# Patient Record
Sex: MALE | Race: AMERICAN INDIAN OR ALASKA NATIVE
[De-identification: names, ages, dates, MRNs, and addresses within clinical notes are randomized per-mention and may not be internally consistent; named-entity substitution may affect disease eponyms.]

---

## 2017-02-23 ENCOUNTER — HOSPITAL ENCOUNTER (EMERGENCY)
Dept: HOSPITAL 5 - ED | Age: 27
LOS: 1 days | Discharge: HOME | End: 2017-02-24
Payer: COMMERCIAL

## 2017-02-23 DIAGNOSIS — S32.018A: Primary | ICD-10-CM
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
## 2017-02-23 DIAGNOSIS — R56.9: ICD-10-CM

## 2017-02-23 DIAGNOSIS — E87.6: ICD-10-CM
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
## 2017-02-23 DIAGNOSIS — S46.912A: ICD-10-CM

## 2017-02-23 DIAGNOSIS — F17.200: ICD-10-CM
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
## 2017-02-23 DIAGNOSIS — F19.10: ICD-10-CM

## 2017-02-23 DIAGNOSIS — S46.911A: ICD-10-CM
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
## 2017-02-23 LAB
ALBUMIN SERPL-MCNC: 4.3 G/DL (ref 3.9–5)
ALBUMIN/GLOB SERPL: 1.4 %
ALP SERPL-CCNC: 57 UNITS/L (ref 35–129)
ALT SERPL-CCNC: 66 UNITS/L (ref 7–56)
ANION GAP SERPL CALC-SCNC: 19 MMOL/L
APTT BLD: 28.1 SEC. (ref 24.2–36.6)
BASOPHILS NFR BLD AUTO: 0.1 % (ref 0–1.8)
BILIRUB SERPL-MCNC: 0.4 MG/DL (ref 0.1–1.2)
BUN SERPL-MCNC: 8 MG/DL (ref 9–20)
BUN/CREAT SERPL: 5.33 %
CALCIUM SERPL-MCNC: 9.3 MG/DL (ref 8.4–10.2)
CHLORIDE SERPL-SCNC: 97.9 MMOL/L (ref 98–107)
CO2 SERPL-SCNC: 25 MMOL/L (ref 22–30)
EOSINOPHIL NFR BLD AUTO: 0.1 % (ref 0–4.3)
GLUCOSE SERPL-MCNC: 139 MG/DL (ref 75–100)
HCT VFR BLD CALC: 40.7 % (ref 35.5–45.6)
HGB BLD-MCNC: 13.1 GM/DL (ref 11.8–15.2)
INR PPP: 1.19 (ref 0.87–1.13)
MAGNESIUM SERPL-MCNC: 2 MG/DL (ref 1.7–2.3)
MCH RBC QN AUTO: 27 PG (ref 28–32)
MCHC RBC AUTO-ENTMCNC: 32 % (ref 32–34)
MCV RBC AUTO: 84 FL (ref 84–94)
PLATELET # BLD: 252 K/MM3 (ref 140–440)
POTASSIUM SERPL-SCNC: 3.3 MMOL/L (ref 3.6–5)
PROT SERPL-MCNC: 7.3 G/DL (ref 6.3–8.2)
RBC # BLD AUTO: 4.86 M/MM3 (ref 3.65–5.03)
SODIUM SERPL-SCNC: 139 MMOL/L (ref 137–145)
URINE DRUGS OF ABUSE NOTE: (no result)
WBC # BLD AUTO: 16.8 K/MM3 (ref 4.5–11)

## 2017-02-23 PROCEDURE — 85025 COMPLETE CBC W/AUTO DIFF WBC: CPT

## 2017-02-23 PROCEDURE — 83735 ASSAY OF MAGNESIUM: CPT

## 2017-02-23 PROCEDURE — 93005 ELECTROCARDIOGRAM TRACING: CPT

## 2017-02-23 PROCEDURE — 72128 CT CHEST SPINE W/O DYE: CPT

## 2017-02-23 PROCEDURE — 72131 CT LUMBAR SPINE W/O DYE: CPT

## 2017-02-23 PROCEDURE — 93010 ELECTROCARDIOGRAM REPORT: CPT

## 2017-02-23 PROCEDURE — 96376 TX/PRO/DX INJ SAME DRUG ADON: CPT

## 2017-02-23 PROCEDURE — 80307 DRUG TEST PRSMV CHEM ANLYZR: CPT

## 2017-02-23 PROCEDURE — 96374 THER/PROPH/DIAG INJ IV PUSH: CPT

## 2017-02-23 PROCEDURE — 80320 DRUG SCREEN QUANTALCOHOLS: CPT

## 2017-02-23 PROCEDURE — 36415 COLL VENOUS BLD VENIPUNCTURE: CPT

## 2017-02-23 PROCEDURE — 80053 COMPREHEN METABOLIC PANEL: CPT

## 2017-02-23 PROCEDURE — 72100 X-RAY EXAM L-S SPINE 2/3 VWS: CPT

## 2017-02-23 PROCEDURE — G0480 DRUG TEST DEF 1-7 CLASSES: HCPCS

## 2017-02-23 PROCEDURE — 85610 PROTHROMBIN TIME: CPT

## 2017-02-23 PROCEDURE — 96375 TX/PRO/DX INJ NEW DRUG ADDON: CPT

## 2017-02-23 PROCEDURE — 96361 HYDRATE IV INFUSION ADD-ON: CPT

## 2017-02-23 PROCEDURE — 70450 CT HEAD/BRAIN W/O DYE: CPT

## 2017-02-23 PROCEDURE — 99285 EMERGENCY DEPT VISIT HI MDM: CPT

## 2017-02-23 PROCEDURE — 73030 X-RAY EXAM OF SHOULDER: CPT

## 2017-02-23 PROCEDURE — 85730 THROMBOPLASTIN TIME PARTIAL: CPT
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
## 2017-02-24 VITALS — SYSTOLIC BLOOD PRESSURE: 130 MMHG | DIASTOLIC BLOOD PRESSURE: 76 MMHG
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
## 2018-08-22 ENCOUNTER — HOSPITAL ENCOUNTER (EMERGENCY)
Dept: HOSPITAL 5 - ED | Age: 28
Discharge: HOME | End: 2018-08-22
Payer: COMMERCIAL

## 2018-08-22 VITALS — DIASTOLIC BLOOD PRESSURE: 90 MMHG | SYSTOLIC BLOOD PRESSURE: 136 MMHG

## 2018-08-22 DIAGNOSIS — F10.20: Primary | ICD-10-CM

## 2018-08-22 DIAGNOSIS — I10: ICD-10-CM

## 2018-08-22 DIAGNOSIS — F17.200: ICD-10-CM

## 2018-08-22 LAB
ALBUMIN SERPL-MCNC: 4.1 G/DL (ref 3.9–5)
ALT SERPL-CCNC: 144 UNITS/L (ref 7–56)
APTT BLD: 27.1 SEC. (ref 24.2–36.6)
BUN SERPL-MCNC: 14 MG/DL (ref 9–20)
BUN/CREAT SERPL: 14 %
CALCIUM SERPL-MCNC: 8.9 MG/DL (ref 8.4–10.2)
HCT VFR BLD CALC: 49 % (ref 35.5–45.6)
HEMOLYSIS INDEX: 12
HGB BLD-MCNC: 16.1 GM/DL (ref 11.8–15.2)
INR PPP: 1.12 (ref 0.87–1.13)
LIPASE SERPL-CCNC: 32 UNITS/L (ref 13–60)
MCH RBC QN AUTO: 28 PG (ref 28–32)
MCHC RBC AUTO-ENTMCNC: 33 % (ref 32–34)
MCV RBC AUTO: 85 FL (ref 84–94)
PLATELET # BLD: 210 K/MM3 (ref 140–440)
RBC # BLD AUTO: 5.77 M/MM3 (ref 3.65–5.03)

## 2018-08-22 PROCEDURE — 83690 ASSAY OF LIPASE: CPT

## 2018-08-22 PROCEDURE — 80320 DRUG SCREEN QUANTALCOHOLS: CPT

## 2018-08-22 PROCEDURE — 93010 ELECTROCARDIOGRAM REPORT: CPT

## 2018-08-22 PROCEDURE — 83735 ASSAY OF MAGNESIUM: CPT

## 2018-08-22 PROCEDURE — 36415 COLL VENOUS BLD VENIPUNCTURE: CPT

## 2018-08-22 PROCEDURE — 85027 COMPLETE CBC AUTOMATED: CPT

## 2018-08-22 PROCEDURE — 76705 ECHO EXAM OF ABDOMEN: CPT

## 2018-08-22 PROCEDURE — 99284 EMERGENCY DEPT VISIT MOD MDM: CPT

## 2018-08-22 PROCEDURE — G0480 DRUG TEST DEF 1-7 CLASSES: HCPCS

## 2018-08-22 PROCEDURE — 80053 COMPREHEN METABOLIC PANEL: CPT

## 2018-08-22 PROCEDURE — 85610 PROTHROMBIN TIME: CPT

## 2018-08-22 PROCEDURE — 85730 THROMBOPLASTIN TIME PARTIAL: CPT

## 2018-08-22 PROCEDURE — 82550 ASSAY OF CK (CPK): CPT

## 2018-08-22 PROCEDURE — 93005 ELECTROCARDIOGRAM TRACING: CPT

## 2018-08-22 NOTE — EMERGENCY DEPARTMENT REPORT
ED General Adult HPI





- General


Chief complaint: Alcohol


Stated complaint: ALCOHOL WITHDRAWAL


Time Seen by Provider: 08/22/18 10:25


Source: patient, RN notes reviewed, old records reviewed


Mode of arrival: Ambulatory


Limitations: No Limitations





- History of Present Illness


Initial comments: 





This is a 28-year-old gentleman who is not known to this provider previously, 

endorses a past medical history of alcohol abuse and pancreatitis.  He presents 

to the ER with resolved upper extremity fidgeting.








He indicates he thinks he is going to alcohol withdrawal.  His last drink was 2 

days ago.  He denies homicidality, suicidality, hallucinations, access to guns, 

firearms.  He endorses burning epigastric discomfort.  It is now resolved.  The 

patient's denies vomiting, testicular pain, lower abdominal pain, irritative, 

obstructive urinary symptoms.  He endorses no radiation, or exacerbating or 

relieving factors that he is aware of.


-: Gradual


Location: abdomen


Consistency: now resolved


Improves with: none


Worsens with: none


Associated Symptoms: denies: confusion, chest pain, cough, diaphoresis, fever/

chills, headaches, loss of appetite, malaise, rash, seizure, shortness of breath

, syncope, weakness





- Related Data


 Previous Rx's











 Medication  Instructions  Recorded  Last Taken  Type


 


HYDROcodone/APAP 5-325 [[place] 1 each PO Q6HR PRN #20 tablet 02/24/17 Unknown Rx





5/325]    


 


Ibuprofen [Motrin] 800 mg PO Q8HR PRN #30 tablet 02/24/17 Unknown Rx


 


Famotidine [Pepcid] 20 mg PO BID #10 tablet 08/22/18 Unknown Rx


 


Ondansetron [Zofran Odt] 4 mg PO Q8HR PRN #20 tab.rapdis 08/22/18 Unknown Rx


 


chlordiazePOXIDE [Librium] 25 mg PO Q6H PRN #20 capsule 08/22/18 Unknown Rx











 Allergies











Allergy/AdvReac Type Severity Reaction Status Date / Time


 


No Known Allergies Allergy   Verified 08/22/18 07:18














ED Review of Systems


ROS: 


Stated complaint: ALCOHOL WITHDRAWAL


Other details as noted in HPI





Comment: All other systems reviewed and negative


Gastrointestinal: abdominal pain


Psychiatric: denies: homicidal thoughts, suicidal thoughts





ED Past Medical Hx





- Past Medical History


Hx Hypertension: Yes


Additional medical history: PANCREATITIS





- Surgical History


Past Surgical History?: No





- Social History


Smoking Status: Current Every Day Smoker


Substance Use Type: Alcohol





- Medications


Home Medications: 


 Home Medications











 Medication  Instructions  Recorded  Confirmed  Last Taken  Type


 


HYDROcodone/APAP 5-325 [[place] 1 each PO Q6HR PRN #20 tablet 02/24/17  Unknown Rx





5/325]     


 


Ibuprofen [Motrin] 800 mg PO Q8HR PRN #30 tablet 02/24/17  Unknown Rx


 


Famotidine [Pepcid] 20 mg PO BID #10 tablet 08/22/18  Unknown Rx


 


Ondansetron [Zofran Odt] 4 mg PO Q8HR PRN #20 tab.rapdis 08/22/18  Unknown Rx


 


chlordiazePOXIDE [Librium] 25 mg PO Q6H PRN #20 capsule 08/22/18  Unknown Rx














ED Physical Exam





- General


Limitations: No Limitations


General appearance: alert, in no apparent distress





- Head


Head exam: Present: atraumatic, normocephalic





- Eye


Eye exam: Present: normal appearance, EOMI.  Absent: nystagmus





- ENT


ENT exam: Present: normal exam, normal orophraynx, mucous membranes moist, 

normal external ear exam





- Neck


Neck exam: Present: normal inspection, full ROM.  Absent: tenderness, 

meningismus





- Respiratory


Respiratory exam: Present: normal lung sounds bilaterally.  Absent: respiratory 

distress





- Cardiovascular


Cardiovascular Exam: Present: regular rate, normal rhythm, normal heart sounds.

  Absent: bradycardia, tachycardia, irregular rhythm, systolic murmur, 

diastolic murmur, rubs, gallop





- GI/Abdominal


GI/Abdominal exam: Present: soft, normal bowel sounds.  Absent: distended, 

tenderness, guarding, rebound, rigid, pulsatile mass





- Rectal


Rectal exam: Present: deferred





- Extremities Exam


Extremities exam: Present: normal inspection, full ROM, normal capillary refill

, other (2+ pulses noted in the bilateral upper, lower extremities.  

Compartments soft.  No long bony tenderness.  The pelvis is stable.).  Absent: 

tenderness, pedal edema, joint swelling, calf tenderness





- Back Exam


Back exam: Present: normal inspection, full ROM.  Absent: tenderness, CVA 

tenderness (R), paraspinal tenderness, vertebral tenderness





- Neurological Exam


Neurological exam: Present: alert (there are no tongue fasciculations.  The 

patient's compartments are soft.), oriented X3, CN II-XII intact, normal gait, 

other (Extraocular movements intact.  Tongue midline.  No facial droop.  Facial 

sensation intact to light touch in the V1, V2, V3 distribution bilaterally.  5 

and 5 strength in 4 extremities..  Sensation is intact to light touch in 4 

extremities.).  Absent: motor sensory deficit





- Psychiatric


Psychiatric exam: Absent: anxious, homicidal ideation, suicidal ideation





- Skin


Skin exam: Present: warm, dry, intact, normal color.  Absent: rash





ED Course


 Vital Signs











  08/22/18 08/22/18 08/22/18





  07:18 10:27 10:30


 


Temperature 99.1 F  


 


Pulse Rate 113 H  


 


Respiratory 18  





Rate   


 


Blood Pressure 136/95 126/88 135/94


 


Blood Pressure   





[Right]   


 


O2 Sat by Pulse 100  





Oximetry   














  08/22/18 08/22/18 08/22/18





  10:37 10:45 13:19


 


Temperature 99.0 F  97.8 F


 


Pulse Rate 90  87


 


Respiratory 20  20





Rate   


 


Blood Pressure  118/79 


 


Blood Pressure 135/92  136/90





[Right]   


 


O2 Sat by Pulse 98 97 99





Oximetry   














- Reevaluation(s)


Reevaluation #1: 





08/22/18 10:58


Differential diagnosis, including but not limited to: Alcohol dependence, GERD, 

gastritis, pancreatitis, medical screening











Assessment and plan: 28-year-old male who endorses abdominal pain and alcohol 

withdrawal.  His tachycardia has resolved, and his abdomen is soft and benign, 

with no tenderness whatsoever.  He is not tachycardic at this time, he is not 

hypertensive and he is not diaphoretic.  His physical examination and current 

presentation are not consistent with acute alcohol withdrawal.  He is 

clinically sober, does not meet 1013 criteria at this time.  He is suitable for 

outpatient management for alcohol dependence.  Based on his current exam, 

normal vital signs, it is my pain that he does not require advanced imaging for 

his abdomen and pelvis at this time.  As a courtesy, I will have the patient 

evaluated by the crisis worker who will provide him with outpatient resources 

for alcohol dependence.


Reevaluation #2: 





08/22/18 13:58


Patient has been resting comfortably in the ER for rales without clinical 

decompensation.  He has a nonspecific transaminitis, and a nonspecific elevated 

bilirubin level.  There is no abdominal tenderness or vomiting.  This is most 

likely an alcohol-induced medical hepatitis.


He is counseled to abstain from alcohol consumption, avoid acetaminophen, and 

he can follow up with a primary care doctor or gastroenterologist for his 

transaminitis and elevated bilirubin level the patient is ordered and seen by 

the case management team, and they have given him outpatient resources for 

alcohol detox.  Patient will also be discharged with as needed Librium, Zofran, 

and Pepcid.








ED Medical Decision Making





- Lab Data


Result diagrams: 


 08/22/18 10:46





 08/22/18 10:46








 Vital Signs











  08/22/18 08/22/18 08/22/18





  07:18 10:27 10:30


 


Temperature 99.1 F  


 


Pulse Rate 113 H  


 


Respiratory 18  





Rate   


 


Blood Pressure 136/95 126/88 135/94


 


Blood Pressure   





[Right]   


 


O2 Sat by Pulse 100  





Oximetry   














  08/22/18 08/22/18





  10:37 10:45


 


Temperature 99.0 F 


 


Pulse Rate 90 


 


Respiratory 20 





Rate  


 


Blood Pressure  118/79


 


Blood Pressure 135/92 





[Right]  


 


O2 Sat by Pulse 98 97





Oximetry  














- EKG Data


-: EKG Interpreted by Me


EKG shows normal: sinus rhythm, axis, intervals, QRS complexes, ST-T waves


Critical care attestation.: 


If time is entered above; I have spent that time in minutes in the direct care 

of this critically ill patient, excluding procedure time.








ED Disposition


Clinical Impression: 


 Alcohol dependence





Disposition: DC-01 TO HOME OR SELFCARE


Is pt being admited?: No


Does the pt Need Aspirin: No


Condition: Stable


Instructions:  Polysubstance Abuse (ED)


Additional Instructions: 


Take the medications as needed/directed.  Follow up with the outpatient 

resources that were provided for alcohol dependence.  Long-term consumption of 

alcohol can cause disability or loss of quality of life and serious 

complications, such as liver failure, heart failure.  Therefore, please make 

every effort to moderate or diminish alcohol consumption.  Follow up with the 

primary care doctor within the next month.  Return to the ER right away with 

new pain, worsened pain, migration of pain, projectile vomiting, change in 

mental status, confusion, inability to tolerate liquid feeds.  Take the Pepcid 

as needed for abdominal pain.  Take the Zofran as stated for nausea.  Take 

Librium as needed for sensation of alcohol withdrawal.





Laboratory studies today demonstrated evidence of liver inflammation, most 

likely alcohol-induced hepatitis.  Avoid consumption of alcohol and 

acetaminophen/Tylenol.  Follow-up with the primary care doctor or 

gastroenterologist within the next month for abnormal laboratory finding/

abnormal liver studies.








Prescriptions: 


chlordiazePOXIDE [Librium] 25 mg PO Q6H PRN #20 capsule


 PRN Reason: Alcohol Withdrawal


Famotidine [Pepcid] 20 mg PO BID #10 tablet


Ondansetron [Zofran Odt] 4 mg PO Q8HR PRN #20 tab.rapdis


 PRN Reason: Nausea


Referrals: 


[facility] [Provider Group] - 3-5 Days


[facility] [[facility] - 3-5 Days


[facility],MD [Primary Care Provider] - 3-5 Days


[facility] [Provider Group] - 3-5 Days

## 2018-08-22 NOTE — ULTRASOUND REPORT
FINAL REPORT



EXAM:  US ABDOMEN LIMITED



HISTORY:  ABD PAIN 



TECHNIQUE:  Grayscale and color doppler ultrasound imaging of the

right upper quadrant was performed.



PRIORS:  None.



FINDINGS:  

Liver: The liver is normal in echogenicity. No focal hepatic

lesions or intrahepatic biliary ductal dilation. 



Gallbladder/Biliary system: No cholelithiasis, gallbladder wall

thickening or pericholecystic fluid. The common bile duct

measures 5.2 millimeters.



Right kidney: The right kidney is normal in echogenicity without

hydronephrosis, cyst, mass or calcification. The right kidney

measures 10.5 x 5.0 x 6.5 centimeters. No renal cortical

thinning. 



Pancreas: The visualized portions of the pancreas demonstrate no

focal lesion.



Aorta/IVC: The visualized portions of the abdominal aorta and IVC

are normal in caliber.



Free fluid: None.



IMPRESSION:  

Normal right upper quadrant abdominal ultrasound.